# Patient Record
Sex: MALE | Race: WHITE | NOT HISPANIC OR LATINO | URBAN - METROPOLITAN AREA
[De-identification: names, ages, dates, MRNs, and addresses within clinical notes are randomized per-mention and may not be internally consistent; named-entity substitution may affect disease eponyms.]

---

## 2019-06-04 ENCOUNTER — APPOINTMENT (RX ONLY)
Dept: URBAN - METROPOLITAN AREA CLINIC 150 | Facility: CLINIC | Age: 74
Setting detail: DERMATOLOGY
End: 2019-06-04

## 2019-06-04 DIAGNOSIS — Z85.828 PERSONAL HISTORY OF OTHER MALIGNANT NEOPLASM OF SKIN: ICD-10-CM

## 2019-06-04 DIAGNOSIS — D22 MELANOCYTIC NEVI: ICD-10-CM

## 2019-06-04 DIAGNOSIS — D18.0 HEMANGIOMA: ICD-10-CM

## 2019-06-04 DIAGNOSIS — L82.1 OTHER SEBORRHEIC KERATOSIS: ICD-10-CM

## 2019-06-04 DIAGNOSIS — L81.4 OTHER MELANIN HYPERPIGMENTATION: ICD-10-CM

## 2019-06-04 PROBLEM — D18.01 HEMANGIOMA OF SKIN AND SUBCUTANEOUS TISSUE: Status: ACTIVE | Noted: 2019-06-04

## 2019-06-04 PROBLEM — I10 ESSENTIAL (PRIMARY) HYPERTENSION: Status: ACTIVE | Noted: 2019-06-04

## 2019-06-04 PROBLEM — D22.5 MELANOCYTIC NEVI OF TRUNK: Status: ACTIVE | Noted: 2019-06-04

## 2019-06-04 PROCEDURE — ? OTHER

## 2019-06-04 PROCEDURE — ? INVENTORY

## 2019-06-04 PROCEDURE — 99202 OFFICE O/P NEW SF 15 MIN: CPT

## 2019-06-04 PROCEDURE — ? OBSERVATION AND MEASURE

## 2019-06-04 PROCEDURE — ? COUNSELING

## 2019-06-04 ASSESSMENT — LOCATION SIMPLE DESCRIPTION DERM
LOCATION SIMPLE: RIGHT UPPER BACK
LOCATION SIMPLE: ABDOMEN
LOCATION SIMPLE: CHEST
LOCATION SIMPLE: LEFT UPPER BACK
LOCATION SIMPLE: LEFT FOREARM
LOCATION SIMPLE: RIGHT PRETIBIAL REGION
LOCATION SIMPLE: LEFT LOWER BACK
LOCATION SIMPLE: UPPER BACK

## 2019-06-04 ASSESSMENT — LOCATION DETAILED DESCRIPTION DERM
LOCATION DETAILED: SUPERIOR THORACIC SPINE
LOCATION DETAILED: RIGHT INFERIOR UPPER BACK
LOCATION DETAILED: PERIUMBILICAL SKIN
LOCATION DETAILED: RIGHT MEDIAL PROXIMAL PRETIBIAL REGION
LOCATION DETAILED: LEFT SUPERIOR UPPER BACK
LOCATION DETAILED: LEFT INFERIOR UPPER BACK
LOCATION DETAILED: LEFT PROXIMAL DORSAL FOREARM
LOCATION DETAILED: LEFT MEDIAL SUPERIOR CHEST
LOCATION DETAILED: LEFT SUPERIOR MEDIAL MIDBACK
LOCATION DETAILED: LEFT SUPERIOR MEDIAL UPPER BACK

## 2019-06-04 ASSESSMENT — LOCATION ZONE DERM
LOCATION ZONE: LEG
LOCATION ZONE: TRUNK
LOCATION ZONE: ARM

## 2019-06-04 NOTE — PROCEDURE: OTHER
Detail Level: Zone
Other (Free Text): Patient states spot has been present for 15 years and has been regularly monitored by prior dermatology offices. Offered biopsy of this area to rule out potential for malignancy. Patient declines at this time stating the area has not changed.
Note Text (......Xxx Chief Complaint.): This diagnosis correlates with the

## 2020-02-11 ENCOUNTER — APPOINTMENT (RX ONLY)
Dept: URBAN - METROPOLITAN AREA CLINIC 150 | Facility: CLINIC | Age: 75
Setting detail: DERMATOLOGY
End: 2020-02-11

## 2020-02-11 DIAGNOSIS — L82.0 INFLAMED SEBORRHEIC KERATOSIS: ICD-10-CM

## 2020-02-11 DIAGNOSIS — Z71.89 OTHER SPECIFIED COUNSELING: ICD-10-CM

## 2020-02-11 DIAGNOSIS — L57.0 ACTINIC KERATOSIS: ICD-10-CM

## 2020-02-11 DIAGNOSIS — L82.1 OTHER SEBORRHEIC KERATOSIS: ICD-10-CM

## 2020-02-11 DIAGNOSIS — L81.4 OTHER MELANIN HYPERPIGMENTATION: ICD-10-CM

## 2020-02-11 DIAGNOSIS — Z85.828 PERSONAL HISTORY OF OTHER MALIGNANT NEOPLASM OF SKIN: ICD-10-CM

## 2020-02-11 PROBLEM — D48.5 NEOPLASM OF UNCERTAIN BEHAVIOR OF SKIN: Status: ACTIVE | Noted: 2020-02-11

## 2020-02-11 PROCEDURE — 17003 DESTRUCT PREMALG LES 2-14: CPT | Mod: 59

## 2020-02-11 PROCEDURE — 11102 TANGNTL BX SKIN SINGLE LES: CPT | Mod: 59

## 2020-02-11 PROCEDURE — 99213 OFFICE O/P EST LOW 20 MIN: CPT | Mod: 25

## 2020-02-11 PROCEDURE — ? FULL BODY SKIN EXAM

## 2020-02-11 PROCEDURE — ? OTHER

## 2020-02-11 PROCEDURE — 17000 DESTRUCT PREMALG LESION: CPT | Mod: 59

## 2020-02-11 PROCEDURE — ? BIOPSY BY SHAVE METHOD

## 2020-02-11 PROCEDURE — 17110 DESTRUCTION B9 LES UP TO 14: CPT

## 2020-02-11 PROCEDURE — 69100 BIOPSY OF EXTERNAL EAR: CPT | Mod: 59

## 2020-02-11 PROCEDURE — ? LIQUID NITROGEN

## 2020-02-11 PROCEDURE — ? COUNSELING

## 2020-02-11 ASSESSMENT — LOCATION SIMPLE DESCRIPTION DERM
LOCATION SIMPLE: LEFT UPPER BACK
LOCATION SIMPLE: CHEST
LOCATION SIMPLE: LEFT LOWER BACK
LOCATION SIMPLE: RIGHT PRETIBIAL REGION
LOCATION SIMPLE: LEFT PRETIBIAL REGION
LOCATION SIMPLE: LEFT UPPER ARM
LOCATION SIMPLE: LEFT FOREARM
LOCATION SIMPLE: LEFT CHEEK
LOCATION SIMPLE: RIGHT CHEEK
LOCATION SIMPLE: LEFT THIGH
LOCATION SIMPLE: RIGHT UPPER BACK
LOCATION SIMPLE: RIGHT FOREARM
LOCATION SIMPLE: RIGHT SHOULDER
LOCATION SIMPLE: RIGHT THIGH

## 2020-02-11 ASSESSMENT — LOCATION DETAILED DESCRIPTION DERM
LOCATION DETAILED: LEFT PROXIMAL DORSAL FOREARM
LOCATION DETAILED: RIGHT INFERIOR CENTRAL MALAR CHEEK
LOCATION DETAILED: LEFT MID-UPPER BACK
LOCATION DETAILED: RIGHT PROXIMAL PRETIBIAL REGION
LOCATION DETAILED: LEFT ANTERIOR DISTAL THIGH
LOCATION DETAILED: LEFT CENTRAL MALAR CHEEK
LOCATION DETAILED: LEFT SUPERIOR UPPER BACK
LOCATION DETAILED: LEFT INFERIOR LATERAL UPPER BACK
LOCATION DETAILED: LEFT DISTAL DORSAL FOREARM
LOCATION DETAILED: LEFT MEDIAL SUPERIOR CHEST
LOCATION DETAILED: RIGHT PROXIMAL DORSAL FOREARM
LOCATION DETAILED: LEFT ANTERIOR PROXIMAL UPPER ARM
LOCATION DETAILED: RIGHT LATERAL MALAR CHEEK
LOCATION DETAILED: RIGHT ANTERIOR SHOULDER
LOCATION DETAILED: LEFT DISTAL PRETIBIAL REGION
LOCATION DETAILED: LEFT SUPERIOR MEDIAL MIDBACK
LOCATION DETAILED: RIGHT ANTERIOR DISTAL THIGH
LOCATION DETAILED: RIGHT SUPERIOR UPPER BACK

## 2020-02-11 ASSESSMENT — LOCATION ZONE DERM
LOCATION ZONE: ARM
LOCATION ZONE: LEG
LOCATION ZONE: FACE
LOCATION ZONE: TRUNK

## 2020-02-11 NOTE — PROCEDURE: LIQUID NITROGEN
Add 52 Modifier (Optional): no
Medical Necessity Information: It is in your best interest to select a reason for this procedure from the list below. All of these items fulfill various CMS LCD requirements except the new and changing color options.
Medical Necessity Clause: This procedure was medically necessary because the lesions that were treated were:
Post-Care Instructions: I reviewed with the patient in detail post-care instructions. Patient is to wear sunprotection, and avoid picking at any of the treated lesions. Pt may apply Vaseline to crusted or scabbing areas.
Consent: The patient's consent was obtained including but not limited to risks of crusting, scabbing, blistering, scarring, darker or lighter pigmentary change, recurrence, incomplete removal and infection.
Detail Level: Detailed
Duration Of Freeze Thaw-Cycle (Seconds): 0

## 2020-02-11 NOTE — PROCEDURE: BIOPSY BY SHAVE METHOD
Detail Level: Detailed
Depth Of Biopsy: dermis
Was A Bandage Applied: Yes
Size Of Lesion In Cm: 0
Biopsy Type: H and E
Biopsy Method: Dermablade
Anesthesia Type: 1% lidocaine with epinephrine
Anesthesia Volume In Cc (Will Not Render If 0): 0.5
Hemostasis: Drysol
Wound Care: Petrolatum
Dressing: bandage
Destruction After The Procedure: No
Type Of Destruction Used: Curettage
Curettage Text: The wound bed was treated with curettage after the biopsy was performed.
Cryotherapy Text: The wound bed was treated with cryotherapy after the biopsy was performed.
Electrodesiccation Text: The wound bed was treated with electrodesiccation after the biopsy was performed.
Electrodesiccation And Curettage Text: The wound bed was treated with electrodesiccation and curettage after the biopsy was performed.
Silver Nitrate Text: The wound bed was treated with silver nitrate after the biopsy was performed.
Lab: [id number]
Consent: Written consent was obtained and risks were reviewed including but not limited to scarring, infection, bleeding, scabbing, incomplete removal, nerve damage and allergy to anesthesia.
Post-Care Instructions: I reviewed with the patient in detail post-care instructions. Patient is to keep the biopsy site dry overnight, and then apply bacitracin twice daily until healed. Patient may apply hydrogen peroxide soaks to remove any crusting.
Notification Instructions: Patient will be notified of biopsy results. However, patient instructed to call the office if not contacted within 2 weeks.
Billing Type: Third-Party Bill

## 2020-02-11 NOTE — PROCEDURE: OTHER
Other (Free Text): Patient reports that this has been present for about 6 years and [place] It has changes appearance in the past month. Biopsy obtained.
Detail Level: Zone
Note Text (......Xxx Chief Complaint.): This diagnosis correlates with the

## 2020-08-11 ENCOUNTER — APPOINTMENT (RX ONLY)
Dept: URBAN - METROPOLITAN AREA CLINIC 150 | Facility: CLINIC | Age: 75
Setting detail: DERMATOLOGY
End: 2020-08-11

## 2020-08-11 DIAGNOSIS — T63.42 TOXIC EFFECT OF VENOM OF ANTS: ICD-10-CM

## 2020-08-11 DIAGNOSIS — Z85.828 PERSONAL HISTORY OF OTHER MALIGNANT NEOPLASM OF SKIN: ICD-10-CM

## 2020-08-11 DIAGNOSIS — L82.1 OTHER SEBORRHEIC KERATOSIS: ICD-10-CM

## 2020-08-11 DIAGNOSIS — Z71.89 OTHER SPECIFIED COUNSELING: ICD-10-CM

## 2020-08-11 DIAGNOSIS — D18.0 HEMANGIOMA: ICD-10-CM

## 2020-08-11 DIAGNOSIS — Z85.820 PERSONAL HISTORY OF MALIGNANT MELANOMA OF SKIN: ICD-10-CM

## 2020-08-11 DIAGNOSIS — L81.4 OTHER MELANIN HYPERPIGMENTATION: ICD-10-CM

## 2020-08-11 PROBLEM — T63.421A TOXIC EFFECT OF VENOM OF ANTS, ACCIDENTAL (UNINTENTIONAL), INITIAL ENCOUNTER: Status: ACTIVE | Noted: 2020-08-11

## 2020-08-11 PROBLEM — D18.01 HEMANGIOMA OF SKIN AND SUBCUTANEOUS TISSUE: Status: ACTIVE | Noted: 2020-08-11

## 2020-08-11 PROCEDURE — ? FULL BODY SKIN EXAM

## 2020-08-11 PROCEDURE — 99214 OFFICE O/P EST MOD 30 MIN: CPT

## 2020-08-11 PROCEDURE — ? ADDITIONAL NOTES

## 2020-08-11 PROCEDURE — ? COUNSELING

## 2020-08-11 ASSESSMENT — LOCATION SIMPLE DESCRIPTION DERM
LOCATION SIMPLE: LEFT THIGH
LOCATION SIMPLE: RIGHT PRETIBIAL REGION
LOCATION SIMPLE: RIGHT SHOULDER
LOCATION SIMPLE: RIGHT FOREARM
LOCATION SIMPLE: LEFT PRETIBIAL REGION
LOCATION SIMPLE: CHEST
LOCATION SIMPLE: LEFT FOREARM
LOCATION SIMPLE: LEFT UPPER ARM
LOCATION SIMPLE: RIGHT EAR
LOCATION SIMPLE: LEFT UPPER BACK
LOCATION SIMPLE: ABDOMEN
LOCATION SIMPLE: LEFT LOWER BACK
LOCATION SIMPLE: RIGHT UPPER BACK
LOCATION SIMPLE: RIGHT CALF
LOCATION SIMPLE: LEFT POSTERIOR THIGH
LOCATION SIMPLE: RIGHT UPPER ARM

## 2020-08-11 ASSESSMENT — LOCATION DETAILED DESCRIPTION DERM
LOCATION DETAILED: LEFT VENTRAL PROXIMAL FOREARM
LOCATION DETAILED: LEFT SUPERIOR LATERAL UPPER BACK
LOCATION DETAILED: PERIUMBILICAL SKIN
LOCATION DETAILED: LEFT LATERAL SUPERIOR CHEST
LOCATION DETAILED: RIGHT ANTERIOR DISTAL UPPER ARM
LOCATION DETAILED: RIGHT DISTAL CALF
LOCATION DETAILED: RIGHT SUPERIOR UPPER BACK
LOCATION DETAILED: RIGHT DISTAL DORSAL FOREARM
LOCATION DETAILED: RIGHT INFERIOR UPPER BACK
LOCATION DETAILED: EPIGASTRIC SKIN
LOCATION DETAILED: RIGHT ANTERIOR SHOULDER
LOCATION DETAILED: LEFT SUPERIOR MEDIAL MIDBACK
LOCATION DETAILED: LEFT INFERIOR MEDIAL UPPER BACK
LOCATION DETAILED: RIGHT SUPERIOR HELIX
LOCATION DETAILED: RIGHT PROXIMAL PRETIBIAL REGION
LOCATION DETAILED: LEFT DISTAL POSTERIOR THIGH
LOCATION DETAILED: LEFT DISTAL PRETIBIAL REGION
LOCATION DETAILED: RIGHT MID-UPPER BACK
LOCATION DETAILED: LEFT PROXIMAL DORSAL FOREARM
LOCATION DETAILED: LEFT SUPERIOR MEDIAL UPPER BACK
LOCATION DETAILED: LEFT ANTERIOR DISTAL THIGH
LOCATION DETAILED: LEFT DISTAL POSTERIOR UPPER ARM

## 2020-08-11 ASSESSMENT — LOCATION ZONE DERM
LOCATION ZONE: LEG
LOCATION ZONE: TRUNK
LOCATION ZONE: EAR
LOCATION ZONE: ARM

## 2020-11-10 ENCOUNTER — APPOINTMENT (RX ONLY)
Dept: URBAN - METROPOLITAN AREA CLINIC 150 | Facility: CLINIC | Age: 75
Setting detail: DERMATOLOGY
End: 2020-11-10

## 2020-11-10 DIAGNOSIS — L30.4 ERYTHEMA INTERTRIGO: ICD-10-CM

## 2020-11-10 DIAGNOSIS — Z85.820 PERSONAL HISTORY OF MALIGNANT MELANOMA OF SKIN: ICD-10-CM

## 2020-11-10 DIAGNOSIS — L90.5 SCAR CONDITIONS AND FIBROSIS OF SKIN: ICD-10-CM

## 2020-11-10 DIAGNOSIS — L72.0 EPIDERMAL CYST: ICD-10-CM

## 2020-11-10 DIAGNOSIS — L81.4 OTHER MELANIN HYPERPIGMENTATION: ICD-10-CM

## 2020-11-10 DIAGNOSIS — Z85.828 PERSONAL HISTORY OF OTHER MALIGNANT NEOPLASM OF SKIN: ICD-10-CM

## 2020-11-10 DIAGNOSIS — D18.0 HEMANGIOMA: ICD-10-CM

## 2020-11-10 DIAGNOSIS — L82.1 OTHER SEBORRHEIC KERATOSIS: ICD-10-CM

## 2020-11-10 DIAGNOSIS — D22 MELANOCYTIC NEVI: ICD-10-CM

## 2020-11-10 DIAGNOSIS — T63.42 TOXIC EFFECT OF VENOM OF ANTS: ICD-10-CM

## 2020-11-10 DIAGNOSIS — Z71.89 OTHER SPECIFIED COUNSELING: ICD-10-CM

## 2020-11-10 PROBLEM — D18.01 HEMANGIOMA OF SKIN AND SUBCUTANEOUS TISSUE: Status: ACTIVE | Noted: 2020-11-10

## 2020-11-10 PROBLEM — T63.421A TOXIC EFFECT OF VENOM OF ANTS, ACCIDENTAL (UNINTENTIONAL), INITIAL ENCOUNTER: Status: ACTIVE | Noted: 2020-11-10

## 2020-11-10 PROBLEM — D22.5 MELANOCYTIC NEVI OF TRUNK: Status: ACTIVE | Noted: 2020-11-10

## 2020-11-10 PROCEDURE — ? OTHER

## 2020-11-10 PROCEDURE — ? FULL BODY SKIN EXAM - DECLINED

## 2020-11-10 PROCEDURE — ? COUNSELING

## 2020-11-10 PROCEDURE — 99214 OFFICE O/P EST MOD 30 MIN: CPT

## 2020-11-10 ASSESSMENT — LOCATION DETAILED DESCRIPTION DERM
LOCATION DETAILED: RIGHT SUPERIOR MEDIAL UPPER BACK
LOCATION DETAILED: RIGHT SUPERIOR MEDIAL LOWER BACK
LOCATION DETAILED: LEFT BUTTOCK
LOCATION DETAILED: LEFT SUPERIOR MEDIAL UPPER BACK
LOCATION DETAILED: RIGHT DISTAL POSTERIOR THIGH
LOCATION DETAILED: LEFT PROXIMAL CALF
LOCATION DETAILED: RIGHT PROXIMAL DORSAL FOREARM
LOCATION DETAILED: LEFT DISTAL PRETIBIAL REGION
LOCATION DETAILED: RIGHT MEDIAL SUPERIOR CHEST
LOCATION DETAILED: LEFT DISTAL POSTERIOR THIGH
LOCATION DETAILED: EPIGASTRIC SKIN
LOCATION DETAILED: LEFT SUPERIOR MEDIAL MIDBACK
LOCATION DETAILED: RIGHT SUPERIOR MEDIAL MIDBACK
LOCATION DETAILED: RIGHT SUPERIOR LATERAL UPPER BACK
LOCATION DETAILED: RIGHT POSTERIOR SHOULDER
LOCATION DETAILED: LEFT DORSAL FOOT
LOCATION DETAILED: LEFT MID-UPPER BACK
LOCATION DETAILED: RIGHT DISTAL CALF
LOCATION DETAILED: RIGHT SUPERIOR HELIX
LOCATION DETAILED: RIGHT DISTAL DORSAL FOREARM
LOCATION DETAILED: SUPRAPUBIC SKIN
LOCATION DETAILED: RIGHT SUPERIOR UPPER BACK

## 2020-11-10 ASSESSMENT — LOCATION SIMPLE DESCRIPTION DERM
LOCATION SIMPLE: LEFT UPPER BACK
LOCATION SIMPLE: ABDOMEN
LOCATION SIMPLE: RIGHT POSTERIOR THIGH
LOCATION SIMPLE: LEFT FOOT
LOCATION SIMPLE: RIGHT LOWER BACK
LOCATION SIMPLE: RIGHT FOREARM
LOCATION SIMPLE: RIGHT CALF
LOCATION SIMPLE: RIGHT UPPER BACK
LOCATION SIMPLE: RIGHT EAR
LOCATION SIMPLE: LEFT CALF
LOCATION SIMPLE: RIGHT SHOULDER
LOCATION SIMPLE: CHEST
LOCATION SIMPLE: LEFT POSTERIOR THIGH
LOCATION SIMPLE: GROIN
LOCATION SIMPLE: LEFT LOWER BACK
LOCATION SIMPLE: LEFT PRETIBIAL REGION
LOCATION SIMPLE: LEFT BUTTOCK

## 2020-11-10 ASSESSMENT — LOCATION ZONE DERM
LOCATION ZONE: TRUNK
LOCATION ZONE: EAR
LOCATION ZONE: FEET
LOCATION ZONE: LEG
LOCATION ZONE: ARM

## 2020-11-10 NOTE — PROCEDURE: OTHER
Other (Free Text): Vitamin e oil massage may help with scarring.
Detail Level: Zone
Note Text (......Xxx Chief Complaint.): This diagnosis correlates with the

## 2021-04-13 ENCOUNTER — APPOINTMENT (RX ONLY)
Dept: URBAN - METROPOLITAN AREA CLINIC 150 | Facility: CLINIC | Age: 76
Setting detail: DERMATOLOGY
End: 2021-04-13

## 2021-04-13 DIAGNOSIS — L82.1 OTHER SEBORRHEIC KERATOSIS: ICD-10-CM

## 2021-04-13 DIAGNOSIS — Z71.89 OTHER SPECIFIED COUNSELING: ICD-10-CM

## 2021-04-13 DIAGNOSIS — Z85.828 PERSONAL HISTORY OF OTHER MALIGNANT NEOPLASM OF SKIN: ICD-10-CM

## 2021-04-13 DIAGNOSIS — D18.0 HEMANGIOMA: ICD-10-CM

## 2021-04-13 DIAGNOSIS — Z85.820 PERSONAL HISTORY OF MALIGNANT MELANOMA OF SKIN: ICD-10-CM

## 2021-04-13 DIAGNOSIS — D22 MELANOCYTIC NEVI: ICD-10-CM

## 2021-04-13 DIAGNOSIS — L85.3 XEROSIS CUTIS: ICD-10-CM

## 2021-04-13 DIAGNOSIS — L90.5 SCAR CONDITIONS AND FIBROSIS OF SKIN: ICD-10-CM

## 2021-04-13 DIAGNOSIS — L81.4 OTHER MELANIN HYPERPIGMENTATION: ICD-10-CM

## 2021-04-13 DIAGNOSIS — L28.1 PRURIGO NODULARIS: ICD-10-CM | Status: WELL CONTROLLED

## 2021-04-13 PROBLEM — D18.01 HEMANGIOMA OF SKIN AND SUBCUTANEOUS TISSUE: Status: ACTIVE | Noted: 2021-04-13

## 2021-04-13 PROBLEM — D22.5 MELANOCYTIC NEVI OF TRUNK: Status: ACTIVE | Noted: 2021-04-13

## 2021-04-13 PROCEDURE — ? ADDITIONAL NOTES

## 2021-04-13 PROCEDURE — ? COUNSELING

## 2021-04-13 PROCEDURE — 99213 OFFICE O/P EST LOW 20 MIN: CPT

## 2021-04-13 PROCEDURE — ? FULL BODY SKIN EXAM

## 2021-04-13 PROCEDURE — ? OTHER

## 2021-04-13 ASSESSMENT — LOCATION SIMPLE DESCRIPTION DERM
LOCATION SIMPLE: RIGHT EAR
LOCATION SIMPLE: LEFT UPPER BACK
LOCATION SIMPLE: RIGHT THIGH
LOCATION SIMPLE: LEFT BUTTOCK
LOCATION SIMPLE: LEFT LOWER BACK
LOCATION SIMPLE: RIGHT FOREARM
LOCATION SIMPLE: RIGHT LOWER BACK
LOCATION SIMPLE: LEFT ANTERIOR NECK
LOCATION SIMPLE: RIGHT PRETIBIAL REGION
LOCATION SIMPLE: CHEST
LOCATION SIMPLE: RIGHT UPPER BACK
LOCATION SIMPLE: RIGHT SHOULDER
LOCATION SIMPLE: LEFT PRETIBIAL REGION

## 2021-04-13 ASSESSMENT — LOCATION DETAILED DESCRIPTION DERM
LOCATION DETAILED: RIGHT SUPERIOR MEDIAL UPPER BACK
LOCATION DETAILED: RIGHT ANTERIOR PROXIMAL THIGH
LOCATION DETAILED: RIGHT PROXIMAL DORSAL FOREARM
LOCATION DETAILED: LEFT INFERIOR UPPER BACK
LOCATION DETAILED: RIGHT SUPERIOR UPPER BACK
LOCATION DETAILED: LEFT INFERIOR ANTERIOR NECK
LOCATION DETAILED: LEFT BUTTOCK
LOCATION DETAILED: RIGHT SUPERIOR HELIX
LOCATION DETAILED: STERNUM
LOCATION DETAILED: RIGHT PROXIMAL PRETIBIAL REGION
LOCATION DETAILED: LEFT DISTAL PRETIBIAL REGION
LOCATION DETAILED: RIGHT DISTAL DORSAL FOREARM
LOCATION DETAILED: RIGHT SUPERIOR MEDIAL MIDBACK
LOCATION DETAILED: LEFT PROXIMAL PRETIBIAL REGION
LOCATION DETAILED: LEFT SUPERIOR MEDIAL MIDBACK
LOCATION DETAILED: RIGHT VENTRAL DISTAL FOREARM
LOCATION DETAILED: RIGHT POSTERIOR SHOULDER
LOCATION DETAILED: RIGHT DISTAL PRETIBIAL REGION

## 2021-04-13 ASSESSMENT — LOCATION ZONE DERM
LOCATION ZONE: LEG
LOCATION ZONE: TRUNK
LOCATION ZONE: NECK
LOCATION ZONE: ARM
LOCATION ZONE: EAR

## 2021-10-12 ENCOUNTER — APPOINTMENT (RX ONLY)
Dept: URBAN - METROPOLITAN AREA CLINIC 150 | Facility: CLINIC | Age: 76
Setting detail: DERMATOLOGY
End: 2021-10-12

## 2021-10-12 DIAGNOSIS — L28.1 PRURIGO NODULARIS: ICD-10-CM

## 2021-10-12 DIAGNOSIS — Z71.89 OTHER SPECIFIED COUNSELING: ICD-10-CM

## 2021-10-12 DIAGNOSIS — D18.0 HEMANGIOMA: ICD-10-CM

## 2021-10-12 DIAGNOSIS — L82.1 OTHER SEBORRHEIC KERATOSIS: ICD-10-CM

## 2021-10-12 DIAGNOSIS — D69.2 OTHER NONTHROMBOCYTOPENIC PURPURA: ICD-10-CM

## 2021-10-12 DIAGNOSIS — Z85.828 PERSONAL HISTORY OF OTHER MALIGNANT NEOPLASM OF SKIN: ICD-10-CM

## 2021-10-12 DIAGNOSIS — L73.8 OTHER SPECIFIED FOLLICULAR DISORDERS: ICD-10-CM

## 2021-10-12 DIAGNOSIS — Z85.820 PERSONAL HISTORY OF MALIGNANT MELANOMA OF SKIN: ICD-10-CM

## 2021-10-12 PROBLEM — D18.01 HEMANGIOMA OF SKIN AND SUBCUTANEOUS TISSUE: Status: ACTIVE | Noted: 2021-10-12

## 2021-10-12 PROCEDURE — ? FULL BODY SKIN EXAM

## 2021-10-12 PROCEDURE — 99213 OFFICE O/P EST LOW 20 MIN: CPT

## 2021-10-12 PROCEDURE — ? COUNSELING

## 2021-10-12 PROCEDURE — ? ADDITIONAL NOTES

## 2021-10-12 ASSESSMENT — LOCATION ZONE DERM
LOCATION ZONE: ARM
LOCATION ZONE: TRUNK
LOCATION ZONE: LEG
LOCATION ZONE: EAR
LOCATION ZONE: FACE

## 2021-10-12 ASSESSMENT — LOCATION SIMPLE DESCRIPTION DERM
LOCATION SIMPLE: RIGHT EAR
LOCATION SIMPLE: RIGHT FOREARM
LOCATION SIMPLE: ABDOMEN
LOCATION SIMPLE: RIGHT ANKLE
LOCATION SIMPLE: UPPER BACK
LOCATION SIMPLE: LEFT FOREARM
LOCATION SIMPLE: RIGHT UPPER BACK
LOCATION SIMPLE: LEFT ANKLE
LOCATION SIMPLE: RIGHT THIGH
LOCATION SIMPLE: RIGHT CHEEK

## 2021-10-12 ASSESSMENT — LOCATION DETAILED DESCRIPTION DERM
LOCATION DETAILED: RIGHT DISTAL DORSAL FOREARM
LOCATION DETAILED: RIGHT SUPERIOR HELIX
LOCATION DETAILED: LEFT RIB CAGE
LOCATION DETAILED: RIGHT CENTRAL MALAR CHEEK
LOCATION DETAILED: LEFT PROXIMAL DORSAL FOREARM
LOCATION DETAILED: SUPERIOR THORACIC SPINE
LOCATION DETAILED: RIGHT ANTERIOR DISTAL THIGH
LOCATION DETAILED: LEFT DISTAL DORSAL FOREARM
LOCATION DETAILED: RIGHT ANKLE
LOCATION DETAILED: RIGHT INFERIOR MEDIAL UPPER BACK
LOCATION DETAILED: EPIGASTRIC SKIN
LOCATION DETAILED: LEFT ANKLE

## 2021-10-12 NOTE — HPI: NON-MELANOMA SKIN CANCER F/U (HISTORY OF NMSC)
How Many Skin Cancers Have You Had?: more than one
What Is The Reason For Today's Visit?: Surveillance against skin cancer recurrences
When Was Your Last Cancer Diagnosed?: 2-2020

## 2021-10-12 NOTE — HPI: MELANOMA F/U (HISTORY OF MALIGNANT MELANOMA)
What Is The Reason For Today's Visit?: Surveillance against skin cancer recurrences
Year Excised?: 2-2020

## 2022-04-19 ENCOUNTER — APPOINTMENT (RX ONLY)
Dept: URBAN - METROPOLITAN AREA CLINIC 150 | Facility: CLINIC | Age: 77
Setting detail: DERMATOLOGY
End: 2022-04-19

## 2022-04-19 DIAGNOSIS — Z85.828 PERSONAL HISTORY OF OTHER MALIGNANT NEOPLASM OF SKIN: ICD-10-CM | Status: RESOLVED

## 2022-04-19 DIAGNOSIS — L82.1 OTHER SEBORRHEIC KERATOSIS: ICD-10-CM

## 2022-04-19 DIAGNOSIS — L72.8 OTHER FOLLICULAR CYSTS OF THE SKIN AND SUBCUTANEOUS TISSUE: ICD-10-CM

## 2022-04-19 DIAGNOSIS — L73.8 OTHER SPECIFIED FOLLICULAR DISORDERS: ICD-10-CM | Status: INADEQUATELY CONTROLLED

## 2022-04-19 DIAGNOSIS — D18.0 HEMANGIOMA: ICD-10-CM

## 2022-04-19 DIAGNOSIS — Z85.820 PERSONAL HISTORY OF MALIGNANT MELANOMA OF SKIN: ICD-10-CM | Status: RESOLVED

## 2022-04-19 DIAGNOSIS — Z71.89 OTHER SPECIFIED COUNSELING: ICD-10-CM

## 2022-04-19 DIAGNOSIS — L28.1 PRURIGO NODULARIS: ICD-10-CM

## 2022-04-19 DIAGNOSIS — L81.4 OTHER MELANIN HYPERPIGMENTATION: ICD-10-CM

## 2022-04-19 PROBLEM — D18.01 HEMANGIOMA OF SKIN AND SUBCUTANEOUS TISSUE: Status: ACTIVE | Noted: 2022-04-19

## 2022-04-19 PROCEDURE — ? COUNSELING

## 2022-04-19 PROCEDURE — ? ADDITIONAL NOTES

## 2022-04-19 PROCEDURE — 99213 OFFICE O/P EST LOW 20 MIN: CPT

## 2022-04-19 PROCEDURE — ? OTHER

## 2022-04-19 PROCEDURE — ? FULL BODY SKIN EXAM

## 2022-04-19 ASSESSMENT — LOCATION DETAILED DESCRIPTION DERM
LOCATION DETAILED: RIGHT LATERAL FOREHEAD
LOCATION DETAILED: LEFT SUPERIOR LATERAL UPPER BACK
LOCATION DETAILED: RIGHT ANTERIOR SHOULDER
LOCATION DETAILED: RIGHT LATERAL MALAR CHEEK
LOCATION DETAILED: LEFT POSTERIOR SHOULDER
LOCATION DETAILED: RIGHT MEDIAL FOREHEAD
LOCATION DETAILED: RIGHT ANTERIOR DISTAL UPPER ARM
LOCATION DETAILED: RIGHT LATERAL PROXIMAL UPPER ARM
LOCATION DETAILED: RIGHT PROXIMAL PRETIBIAL REGION
LOCATION DETAILED: LEFT POSTERIOR ANKLE
LOCATION DETAILED: PERIUMBILICAL SKIN
LOCATION DETAILED: RIGHT LATERAL SHOULDER
LOCATION DETAILED: RIGHT SUPERIOR MEDIAL MIDBACK
LOCATION DETAILED: RIGHT ANTERIOR DISTAL THIGH
LOCATION DETAILED: RIGHT MEDIAL UPPER BACK
LOCATION DETAILED: RIGHT LATERAL SHOULDER
LOCATION DETAILED: RIGHT SUPERIOR LATERAL MALAR CHEEK
LOCATION DETAILED: RIGHT LATERAL PROXIMAL UPPER ARM
LOCATION DETAILED: RIGHT DISTAL POSTERIOR THIGH
LOCATION DETAILED: RIGHT POSTERIOR ANKLE
LOCATION DETAILED: LEFT MEDIAL UPPER BACK
LOCATION DETAILED: LEFT DISTAL POSTERIOR UPPER ARM
LOCATION DETAILED: LEFT FOREHEAD
LOCATION DETAILED: RIGHT PROXIMAL CALF
LOCATION DETAILED: RIGHT SUPERIOR HELIX
LOCATION DETAILED: RIGHT INFERIOR MEDIAL FOREHEAD
LOCATION DETAILED: LEFT CENTRAL MALAR CHEEK
LOCATION DETAILED: RIGHT DISTAL PRETIBIAL REGION
LOCATION DETAILED: LEFT ANTERIOR PROXIMAL THIGH
LOCATION DETAILED: LEFT SUPERIOR CENTRAL MALAR CHEEK
LOCATION DETAILED: RIGHT ANTERIOR PROXIMAL THIGH
LOCATION DETAILED: LEFT DISTAL PRETIBIAL REGION
LOCATION DETAILED: RIGHT PROXIMAL DORSAL FOREARM
LOCATION DETAILED: LEFT DISTAL CALF
LOCATION DETAILED: LEFT PROXIMAL POSTERIOR THIGH
LOCATION DETAILED: STERNUM
LOCATION DETAILED: RIGHT POSTERIOR SHOULDER
LOCATION DETAILED: LEFT PROXIMAL DORSAL FOREARM
LOCATION DETAILED: EPIGASTRIC SKIN

## 2022-04-19 ASSESSMENT — LOCATION SIMPLE DESCRIPTION DERM
LOCATION SIMPLE: RIGHT THIGH
LOCATION SIMPLE: RIGHT CALF
LOCATION SIMPLE: LEFT CHEEK
LOCATION SIMPLE: RIGHT SHOULDER
LOCATION SIMPLE: RIGHT EAR
LOCATION SIMPLE: LEFT UPPER ARM
LOCATION SIMPLE: LEFT ANKLE
LOCATION SIMPLE: RIGHT FOREHEAD
LOCATION SIMPLE: LEFT FOREARM
LOCATION SIMPLE: RIGHT CHEEK
LOCATION SIMPLE: CHEST
LOCATION SIMPLE: RIGHT SHOULDER
LOCATION SIMPLE: LEFT FOREHEAD
LOCATION SIMPLE: RIGHT UPPER ARM
LOCATION SIMPLE: LEFT POSTERIOR THIGH
LOCATION SIMPLE: RIGHT ANKLE
LOCATION SIMPLE: RIGHT UPPER ARM
LOCATION SIMPLE: RIGHT POSTERIOR THIGH
LOCATION SIMPLE: LEFT UPPER BACK
LOCATION SIMPLE: LEFT PRETIBIAL REGION
LOCATION SIMPLE: LEFT SHOULDER
LOCATION SIMPLE: RIGHT PRETIBIAL REGION
LOCATION SIMPLE: LEFT CALF
LOCATION SIMPLE: RIGHT UPPER BACK
LOCATION SIMPLE: ABDOMEN
LOCATION SIMPLE: RIGHT LOWER BACK
LOCATION SIMPLE: LEFT THIGH
LOCATION SIMPLE: RIGHT FOREARM

## 2022-04-19 ASSESSMENT — LOCATION ZONE DERM
LOCATION ZONE: LEG
LOCATION ZONE: EAR
LOCATION ZONE: ARM
LOCATION ZONE: TRUNK
LOCATION ZONE: FACE
LOCATION ZONE: ARM

## 2022-10-25 ENCOUNTER — APPOINTMENT (RX ONLY)
Dept: URBAN - METROPOLITAN AREA CLINIC 151 | Facility: CLINIC | Age: 77
Setting detail: DERMATOLOGY
End: 2022-10-25

## 2022-10-25 DIAGNOSIS — L81.4 OTHER MELANIN HYPERPIGMENTATION: ICD-10-CM

## 2022-10-25 DIAGNOSIS — L73.8 OTHER SPECIFIED FOLLICULAR DISORDERS: ICD-10-CM

## 2022-10-25 DIAGNOSIS — L28.1 PRURIGO NODULARIS: ICD-10-CM

## 2022-10-25 DIAGNOSIS — Z85.820 PERSONAL HISTORY OF MALIGNANT MELANOMA OF SKIN: ICD-10-CM

## 2022-10-25 DIAGNOSIS — D18.0 HEMANGIOMA: ICD-10-CM

## 2022-10-25 DIAGNOSIS — Z85.828 PERSONAL HISTORY OF OTHER MALIGNANT NEOPLASM OF SKIN: ICD-10-CM

## 2022-10-25 DIAGNOSIS — L82.1 OTHER SEBORRHEIC KERATOSIS: ICD-10-CM

## 2022-10-25 PROBLEM — D18.01 HEMANGIOMA OF SKIN AND SUBCUTANEOUS TISSUE: Status: ACTIVE | Noted: 2022-10-25

## 2022-10-25 PROCEDURE — ? OTHER

## 2022-10-25 PROCEDURE — ? FULL BODY SKIN EXAM

## 2022-10-25 PROCEDURE — 99213 OFFICE O/P EST LOW 20 MIN: CPT

## 2022-10-25 PROCEDURE — ? ADDITIONAL NOTES

## 2022-10-25 PROCEDURE — ? COUNSELING

## 2022-10-25 PROCEDURE — ? TREATMENT REGIMEN

## 2022-10-25 ASSESSMENT — LOCATION SIMPLE DESCRIPTION DERM
LOCATION SIMPLE: RIGHT SHOULDER
LOCATION SIMPLE: RIGHT CHEEK
LOCATION SIMPLE: LEFT UPPER ARM
LOCATION SIMPLE: LEFT ANKLE
LOCATION SIMPLE: LEFT FOREHEAD
LOCATION SIMPLE: CHEST
LOCATION SIMPLE: RIGHT UPPER BACK
LOCATION SIMPLE: RIGHT EAR
LOCATION SIMPLE: RIGHT FOREARM
LOCATION SIMPLE: RIGHT ANKLE
LOCATION SIMPLE: LEFT PRETIBIAL REGION
LOCATION SIMPLE: RIGHT LOWER BACK
LOCATION SIMPLE: RIGHT THIGH
LOCATION SIMPLE: LEFT UPPER BACK
LOCATION SIMPLE: LEFT CHEEK
LOCATION SIMPLE: RIGHT PRETIBIAL REGION
LOCATION SIMPLE: RIGHT FOREHEAD
LOCATION SIMPLE: ABDOMEN

## 2022-10-25 ASSESSMENT — LOCATION DETAILED DESCRIPTION DERM
LOCATION DETAILED: LEFT POSTERIOR ANKLE
LOCATION DETAILED: RIGHT SUPERIOR HELIX
LOCATION DETAILED: LEFT SUPERIOR LATERAL UPPER BACK
LOCATION DETAILED: RIGHT ANTERIOR DISTAL THIGH
LOCATION DETAILED: RIGHT POSTERIOR ANKLE
LOCATION DETAILED: STERNUM
LOCATION DETAILED: RIGHT SUPERIOR MEDIAL MIDBACK
LOCATION DETAILED: PERIUMBILICAL SKIN
LOCATION DETAILED: RIGHT PROXIMAL DORSAL FOREARM
LOCATION DETAILED: RIGHT PROXIMAL RADIAL DORSAL FOREARM
LOCATION DETAILED: RIGHT MEDIAL MALAR CHEEK
LOCATION DETAILED: RIGHT POSTERIOR SHOULDER
LOCATION DETAILED: RIGHT INFERIOR LATERAL FOREHEAD
LOCATION DETAILED: RIGHT MEDIAL UPPER BACK
LOCATION DETAILED: RIGHT ANTERIOR PROXIMAL THIGH
LOCATION DETAILED: LEFT DISTAL PRETIBIAL REGION
LOCATION DETAILED: LEFT MEDIAL UPPER BACK
LOCATION DETAILED: RIGHT DISTAL PRETIBIAL REGION
LOCATION DETAILED: LEFT DISTAL POSTERIOR UPPER ARM
LOCATION DETAILED: RIGHT SUPERIOR MEDIAL UPPER BACK
LOCATION DETAILED: LEFT INFERIOR LATERAL FOREHEAD
LOCATION DETAILED: LEFT MEDIAL MALAR CHEEK

## 2022-10-25 ASSESSMENT — LOCATION ZONE DERM
LOCATION ZONE: ARM
LOCATION ZONE: EAR
LOCATION ZONE: FACE
LOCATION ZONE: TRUNK
LOCATION ZONE: LEG

## 2022-10-25 NOTE — PROCEDURE: OTHER
Other (Free Text): 2018
Detail Level: Zone
Note Text (......Xxx Chief Complaint.): This diagnosis correlates with the
Render Risk Assessment In Note?: yes

## 2022-10-25 NOTE — PROCEDURE: COUNSELING
Detail Level: Zone
Detail Level: Detailed
Quality 137: Melanoma: Continuity Of Care - Recall System: Patient information entered into a recall system that includes: target date for the next exam specified AND a process to follow up with patients regarding missed or unscheduled appointments
When Should The Patient Follow-Up For Their Next Full-Body Skin Exam?: 3 Months
When Should The Patient Follow-Up For Their Next Full-Body Skin Exam?: 6 Months
Detail Level: Generalized

## 2023-04-25 ENCOUNTER — APPOINTMENT (RX ONLY)
Dept: URBAN - METROPOLITAN AREA CLINIC 151 | Facility: CLINIC | Age: 78
Setting detail: DERMATOLOGY
End: 2023-04-25

## 2023-04-25 DIAGNOSIS — Z85.820 PERSONAL HISTORY OF MALIGNANT MELANOMA OF SKIN: ICD-10-CM | Status: RESOLVED

## 2023-04-25 DIAGNOSIS — D18.0 HEMANGIOMA: ICD-10-CM

## 2023-04-25 DIAGNOSIS — L81.4 OTHER MELANIN HYPERPIGMENTATION: ICD-10-CM

## 2023-04-25 DIAGNOSIS — D22 MELANOCYTIC NEVI: ICD-10-CM

## 2023-04-25 DIAGNOSIS — L82.1 OTHER SEBORRHEIC KERATOSIS: ICD-10-CM

## 2023-04-25 DIAGNOSIS — Z85.828 PERSONAL HISTORY OF OTHER MALIGNANT NEOPLASM OF SKIN: ICD-10-CM | Status: RESOLVED

## 2023-04-25 DIAGNOSIS — L72.8 OTHER FOLLICULAR CYSTS OF THE SKIN AND SUBCUTANEOUS TISSUE: ICD-10-CM | Status: STABLE

## 2023-04-25 PROBLEM — D18.01 HEMANGIOMA OF SKIN AND SUBCUTANEOUS TISSUE: Status: ACTIVE | Noted: 2023-04-25

## 2023-04-25 PROBLEM — D22.5 MELANOCYTIC NEVI OF TRUNK: Status: ACTIVE | Noted: 2023-04-25

## 2023-04-25 PROCEDURE — ? COUNSELING

## 2023-04-25 PROCEDURE — ? FULL BODY SKIN EXAM

## 2023-04-25 PROCEDURE — ? TREATMENT REGIMEN

## 2023-04-25 PROCEDURE — 99213 OFFICE O/P EST LOW 20 MIN: CPT

## 2023-04-25 PROCEDURE — ? OTHER

## 2023-04-25 ASSESSMENT — LOCATION DETAILED DESCRIPTION DERM
LOCATION DETAILED: SUPERIOR THORACIC SPINE
LOCATION DETAILED: LEFT ANTERIOR DISTAL THIGH
LOCATION DETAILED: RIGHT SUPERIOR MEDIAL MIDBACK
LOCATION DETAILED: INFERIOR THORACIC SPINE
LOCATION DETAILED: RIGHT ANTERIOR DISTAL THIGH
LOCATION DETAILED: STERNUM
LOCATION DETAILED: RIGHT INFERIOR CENTRAL MALAR CHEEK
LOCATION DETAILED: PERIUMBILICAL SKIN
LOCATION DETAILED: LEFT MEDIAL SUPERIOR CHEST
LOCATION DETAILED: LEFT INFERIOR CENTRAL MALAR CHEEK
LOCATION DETAILED: RIGHT PROXIMAL DORSAL FOREARM
LOCATION DETAILED: RIGHT SUPERIOR HELIX
LOCATION DETAILED: RIGHT SUPERIOR LATERAL UPPER BACK

## 2023-04-25 ASSESSMENT — LOCATION ZONE DERM
LOCATION ZONE: LEG
LOCATION ZONE: FACE
LOCATION ZONE: ARM
LOCATION ZONE: EAR
LOCATION ZONE: TRUNK

## 2023-04-25 ASSESSMENT — LOCATION SIMPLE DESCRIPTION DERM
LOCATION SIMPLE: ABDOMEN
LOCATION SIMPLE: UPPER BACK
LOCATION SIMPLE: LEFT THIGH
LOCATION SIMPLE: RIGHT UPPER BACK
LOCATION SIMPLE: RIGHT LOWER BACK
LOCATION SIMPLE: RIGHT CHEEK
LOCATION SIMPLE: RIGHT FOREARM
LOCATION SIMPLE: LEFT CHEEK
LOCATION SIMPLE: RIGHT THIGH
LOCATION SIMPLE: RIGHT EAR
LOCATION SIMPLE: CHEST

## 2023-04-25 NOTE — PROCEDURE: COUNSELING
Detail Level: Generalized
Detail Level: Detailed
Quality 137: Melanoma: Continuity Of Care - Recall System: Patient information entered into a recall system that includes: target date for the next exam specified AND a process to follow up with patients regarding missed or unscheduled appointments
When Should The Patient Follow-Up For Their Next Full-Body Skin Exam?: 3 Months
When Should The Patient Follow-Up For Their Next Full-Body Skin Exam?: 6 Months

## 2023-04-25 NOTE — HPI: EVALUATION OF SKIN LESION(S)
What Type Of Note Output Would You Prefer (Optional)?: Standard Output
Hpi Title: Evaluation of Skin Lesions
How Severe Are Your Spot(S)?: mild
Have Your Spot(S) Been Treated In The Past?: has not been treated
Location: Left forearm
Year Removed: 2020

## 2023-10-25 ENCOUNTER — APPOINTMENT (RX ONLY)
Dept: URBAN - METROPOLITAN AREA CLINIC 151 | Facility: CLINIC | Age: 78
Setting detail: DERMATOLOGY
End: 2023-10-25

## 2023-10-25 DIAGNOSIS — Z85.820 PERSONAL HISTORY OF MALIGNANT MELANOMA OF SKIN: ICD-10-CM | Status: RESOLVED

## 2023-10-25 DIAGNOSIS — Z85.828 PERSONAL HISTORY OF OTHER MALIGNANT NEOPLASM OF SKIN: ICD-10-CM | Status: RESOLVED

## 2023-10-25 DIAGNOSIS — L28.1 PRURIGO NODULARIS: ICD-10-CM | Status: STABLE

## 2023-10-25 DIAGNOSIS — L72.8 OTHER FOLLICULAR CYSTS OF THE SKIN AND SUBCUTANEOUS TISSUE: ICD-10-CM | Status: STABLE

## 2023-10-25 DIAGNOSIS — D22 MELANOCYTIC NEVI: ICD-10-CM

## 2023-10-25 DIAGNOSIS — D18.0 HEMANGIOMA: ICD-10-CM

## 2023-10-25 DIAGNOSIS — L81.4 OTHER MELANIN HYPERPIGMENTATION: ICD-10-CM

## 2023-10-25 DIAGNOSIS — L82.1 OTHER SEBORRHEIC KERATOSIS: ICD-10-CM

## 2023-10-25 PROBLEM — D22.5 MELANOCYTIC NEVI OF TRUNK: Status: ACTIVE | Noted: 2023-10-25

## 2023-10-25 PROBLEM — D18.01 HEMANGIOMA OF SKIN AND SUBCUTANEOUS TISSUE: Status: ACTIVE | Noted: 2023-10-25

## 2023-10-25 PROCEDURE — ? FULL BODY SKIN EXAM

## 2023-10-25 PROCEDURE — 99213 OFFICE O/P EST LOW 20 MIN: CPT

## 2023-10-25 PROCEDURE — ? COUNSELING

## 2023-10-25 PROCEDURE — ? OTHER

## 2023-10-25 PROCEDURE — ? TREATMENT REGIMEN

## 2023-10-25 ASSESSMENT — LOCATION SIMPLE DESCRIPTION DERM
LOCATION SIMPLE: LEFT THIGH
LOCATION SIMPLE: RIGHT LOWER BACK
LOCATION SIMPLE: RIGHT CHEEK
LOCATION SIMPLE: UPPER BACK
LOCATION SIMPLE: RIGHT PRETIBIAL REGION
LOCATION SIMPLE: LEFT PRETIBIAL REGION
LOCATION SIMPLE: CHEST
LOCATION SIMPLE: RIGHT UPPER BACK
LOCATION SIMPLE: RIGHT EAR
LOCATION SIMPLE: RIGHT KNEE
LOCATION SIMPLE: LEFT UPPER ARM
LOCATION SIMPLE: LEFT CHEEK
LOCATION SIMPLE: RIGHT THIGH
LOCATION SIMPLE: LEFT ANKLE
LOCATION SIMPLE: ABDOMEN
LOCATION SIMPLE: RIGHT ANKLE
LOCATION SIMPLE: RIGHT FOREARM

## 2023-10-25 ASSESSMENT — LOCATION DETAILED DESCRIPTION DERM
LOCATION DETAILED: LEFT PROXIMAL POSTERIOR UPPER ARM
LOCATION DETAILED: PERIUMBILICAL SKIN
LOCATION DETAILED: LEFT ANTERIOR DISTAL THIGH
LOCATION DETAILED: STERNUM
LOCATION DETAILED: RIGHT INFERIOR CENTRAL MALAR CHEEK
LOCATION DETAILED: LEFT INFERIOR CENTRAL MALAR CHEEK
LOCATION DETAILED: RIGHT PROXIMAL DORSAL FOREARM
LOCATION DETAILED: LEFT DISTAL PRETIBIAL REGION
LOCATION DETAILED: RIGHT SUPERIOR MEDIAL MIDBACK
LOCATION DETAILED: RIGHT KNEE
LOCATION DETAILED: RIGHT ANTERIOR DISTAL THIGH
LOCATION DETAILED: RIGHT SUPERIOR LATERAL UPPER BACK
LOCATION DETAILED: INFERIOR THORACIC SPINE
LOCATION DETAILED: LEFT POSTERIOR ANKLE
LOCATION DETAILED: RIGHT POSTERIOR ANKLE
LOCATION DETAILED: RIGHT DISTAL PRETIBIAL REGION
LOCATION DETAILED: SUPERIOR THORACIC SPINE
LOCATION DETAILED: LEFT MEDIAL SUPERIOR CHEST
LOCATION DETAILED: RIGHT SUPERIOR HELIX

## 2023-10-25 ASSESSMENT — LOCATION ZONE DERM
LOCATION ZONE: LEG
LOCATION ZONE: FACE
LOCATION ZONE: EAR
LOCATION ZONE: ARM
LOCATION ZONE: TRUNK

## 2023-10-25 NOTE — PROCEDURE: COUNSELING
Detail Level: Generalized
Detail Level: Detailed
Quality 137: Melanoma: Continuity Of Care - Recall System: Patient information entered into a recall system that includes: target date for the next exam specified AND a process to follow up with patients regarding missed or unscheduled appointments
When Should The Patient Follow-Up For Their Next Full-Body Skin Exam?: 3 Months
When Should The Patient Follow-Up For Their Next Full-Body Skin Exam?: 6 Months
Detail Level: Zone

## 2024-04-25 ENCOUNTER — APPOINTMENT (RX ONLY)
Dept: URBAN - METROPOLITAN AREA CLINIC 151 | Facility: CLINIC | Age: 79
Setting detail: DERMATOLOGY
End: 2024-04-25

## 2024-04-25 DIAGNOSIS — D18.0 HEMANGIOMA: ICD-10-CM

## 2024-04-25 DIAGNOSIS — L28.1 PRURIGO NODULARIS: ICD-10-CM | Status: STABLE

## 2024-04-25 DIAGNOSIS — L57.0 ACTINIC KERATOSIS: ICD-10-CM | Status: INADEQUATELY CONTROLLED

## 2024-04-25 DIAGNOSIS — L81.4 OTHER MELANIN HYPERPIGMENTATION: ICD-10-CM

## 2024-04-25 DIAGNOSIS — L72.8 OTHER FOLLICULAR CYSTS OF THE SKIN AND SUBCUTANEOUS TISSUE: ICD-10-CM | Status: STABLE

## 2024-04-25 DIAGNOSIS — Z85.820 PERSONAL HISTORY OF MALIGNANT MELANOMA OF SKIN: ICD-10-CM

## 2024-04-25 DIAGNOSIS — L82.1 OTHER SEBORRHEIC KERATOSIS: ICD-10-CM

## 2024-04-25 DIAGNOSIS — D22 MELANOCYTIC NEVI: ICD-10-CM

## 2024-04-25 DIAGNOSIS — Z85.828 PERSONAL HISTORY OF OTHER MALIGNANT NEOPLASM OF SKIN: ICD-10-CM

## 2024-04-25 PROBLEM — D18.01 HEMANGIOMA OF SKIN AND SUBCUTANEOUS TISSUE: Status: ACTIVE | Noted: 2024-04-25

## 2024-04-25 PROBLEM — D48.5 NEOPLASM OF UNCERTAIN BEHAVIOR OF SKIN: Status: ACTIVE | Noted: 2024-04-25

## 2024-04-25 PROBLEM — D22.5 MELANOCYTIC NEVI OF TRUNK: Status: ACTIVE | Noted: 2024-04-25

## 2024-04-25 PROCEDURE — ? LIQUID NITROGEN

## 2024-04-25 PROCEDURE — 17000 DESTRUCT PREMALG LESION: CPT | Mod: 59

## 2024-04-25 PROCEDURE — 11102 TANGNTL BX SKIN SINGLE LES: CPT

## 2024-04-25 PROCEDURE — ? FULL BODY SKIN EXAM

## 2024-04-25 PROCEDURE — ? BIOPSY BY SHAVE METHOD

## 2024-04-25 PROCEDURE — ? OTHER

## 2024-04-25 PROCEDURE — ? TREATMENT REGIMEN

## 2024-04-25 PROCEDURE — ? COUNSELING

## 2024-04-25 PROCEDURE — 99213 OFFICE O/P EST LOW 20 MIN: CPT | Mod: 25

## 2024-04-25 ASSESSMENT — LOCATION DETAILED DESCRIPTION DERM
LOCATION DETAILED: RIGHT SUPERIOR MEDIAL MIDBACK
LOCATION DETAILED: LEFT INFERIOR UPPER BACK
LOCATION DETAILED: RIGHT PROXIMAL DORSAL FOREARM
LOCATION DETAILED: STERNUM
LOCATION DETAILED: RIGHT SUPERIOR HELIX
LOCATION DETAILED: RIGHT SUPERIOR UPPER BACK
LOCATION DETAILED: LEFT INFERIOR CENTRAL MALAR CHEEK
LOCATION DETAILED: RIGHT ANTERIOR DISTAL THIGH
LOCATION DETAILED: LEFT PROXIMAL PRETIBIAL REGION
LOCATION DETAILED: LEFT SUPERIOR MEDIAL UPPER BACK
LOCATION DETAILED: RIGHT INFERIOR CENTRAL MALAR CHEEK
LOCATION DETAILED: INFERIOR THORACIC SPINE
LOCATION DETAILED: RIGHT ANTERIOR PROXIMAL THIGH
LOCATION DETAILED: LEFT MEDIAL SUPERIOR CHEST
LOCATION DETAILED: RIGHT PROXIMAL PRETIBIAL REGION
LOCATION DETAILED: PERIUMBILICAL SKIN
LOCATION DETAILED: LEFT ANTERIOR DISTAL THIGH
LOCATION DETAILED: SUPERIOR THORACIC SPINE

## 2024-04-25 ASSESSMENT — LOCATION SIMPLE DESCRIPTION DERM
LOCATION SIMPLE: LEFT UPPER BACK
LOCATION SIMPLE: LEFT CHEEK
LOCATION SIMPLE: LEFT PRETIBIAL REGION
LOCATION SIMPLE: RIGHT LOWER BACK
LOCATION SIMPLE: RIGHT UPPER BACK
LOCATION SIMPLE: RIGHT THIGH
LOCATION SIMPLE: RIGHT FOREARM
LOCATION SIMPLE: ABDOMEN
LOCATION SIMPLE: RIGHT PRETIBIAL REGION
LOCATION SIMPLE: UPPER BACK
LOCATION SIMPLE: LEFT THIGH
LOCATION SIMPLE: CHEST
LOCATION SIMPLE: RIGHT CHEEK
LOCATION SIMPLE: RIGHT EAR

## 2024-04-25 ASSESSMENT — LOCATION ZONE DERM
LOCATION ZONE: FACE
LOCATION ZONE: EAR
LOCATION ZONE: LEG
LOCATION ZONE: ARM
LOCATION ZONE: TRUNK

## 2024-04-25 NOTE — PROCEDURE: OTHER
Other (Free Text): 2020
Detail Level: Zone
Note Text (......Xxx Chief Complaint.): This diagnosis correlates with the
Render Risk Assessment In Note?: yes

## 2024-04-25 NOTE — PROCEDURE: LIQUID NITROGEN
Number Of Freeze-Thaw Cycles: 3 freeze-thaw cycles
Duration Of Freeze Thaw-Cycle (Seconds): 2
Show Aperture Variable?: Yes
Consent: The patient's consent was obtained including but not limited to risks of crusting, scabbing, blistering, scarring, darker or lighter pigmentary change, recurrence, incomplete removal and infection.
Post-Care Instructions: I reviewed with the patient in detail post-care instructions. Patient is to wear sunprotection, and avoid picking at any of the treated lesions. Pt may apply Vaseline to crusted or scabbing areas.
Detail Level: Detailed
Render Post-Care Instructions In Note?: no

## 2024-10-29 ENCOUNTER — APPOINTMENT (RX ONLY)
Dept: URBAN - METROPOLITAN AREA CLINIC 331 | Facility: CLINIC | Age: 79
Setting detail: DERMATOLOGY
End: 2024-10-29

## 2024-10-29 DIAGNOSIS — L72.8 OTHER FOLLICULAR CYSTS OF THE SKIN AND SUBCUTANEOUS TISSUE: ICD-10-CM | Status: STABLE

## 2024-10-29 DIAGNOSIS — Z85.820 PERSONAL HISTORY OF MALIGNANT MELANOMA OF SKIN: ICD-10-CM

## 2024-10-29 DIAGNOSIS — D22 MELANOCYTIC NEVI: ICD-10-CM

## 2024-10-29 DIAGNOSIS — L81.4 OTHER MELANIN HYPERPIGMENTATION: ICD-10-CM

## 2024-10-29 DIAGNOSIS — L82.1 OTHER SEBORRHEIC KERATOSIS: ICD-10-CM

## 2024-10-29 DIAGNOSIS — D18.0 HEMANGIOMA: ICD-10-CM

## 2024-10-29 DIAGNOSIS — Z85.828 PERSONAL HISTORY OF OTHER MALIGNANT NEOPLASM OF SKIN: ICD-10-CM

## 2024-10-29 PROBLEM — D22.5 MELANOCYTIC NEVI OF TRUNK: Status: ACTIVE | Noted: 2024-10-29

## 2024-10-29 PROBLEM — D18.01 HEMANGIOMA OF SKIN AND SUBCUTANEOUS TISSUE: Status: ACTIVE | Noted: 2024-10-29

## 2024-10-29 PROCEDURE — ? TREATMENT REGIMEN

## 2024-10-29 PROCEDURE — ? OTHER

## 2024-10-29 PROCEDURE — 99213 OFFICE O/P EST LOW 20 MIN: CPT

## 2024-10-29 PROCEDURE — ? FULL BODY SKIN EXAM

## 2024-10-29 PROCEDURE — ? COUNSELING

## 2024-10-29 ASSESSMENT — LOCATION DETAILED DESCRIPTION DERM
LOCATION DETAILED: RIGHT PROXIMAL DORSAL FOREARM
LOCATION DETAILED: RIGHT SUPERIOR HELIX
LOCATION DETAILED: PERIUMBILICAL SKIN
LOCATION DETAILED: RIGHT ANTERIOR DISTAL THIGH
LOCATION DETAILED: LEFT MEDIAL SUPERIOR CHEST
LOCATION DETAILED: LEFT ANTERIOR DISTAL THIGH
LOCATION DETAILED: LEFT INFERIOR CENTRAL MALAR CHEEK
LOCATION DETAILED: RIGHT SUPERIOR MEDIAL MIDBACK
LOCATION DETAILED: INFERIOR THORACIC SPINE
LOCATION DETAILED: STERNUM
LOCATION DETAILED: RIGHT POSTERIOR SHOULDER
LOCATION DETAILED: RIGHT INFERIOR CENTRAL MALAR CHEEK
LOCATION DETAILED: SUPERIOR THORACIC SPINE

## 2024-10-29 ASSESSMENT — LOCATION SIMPLE DESCRIPTION DERM
LOCATION SIMPLE: RIGHT THIGH
LOCATION SIMPLE: RIGHT FOREARM
LOCATION SIMPLE: UPPER BACK
LOCATION SIMPLE: RIGHT EAR
LOCATION SIMPLE: RIGHT CHEEK
LOCATION SIMPLE: ABDOMEN
LOCATION SIMPLE: CHEST
LOCATION SIMPLE: LEFT CHEEK
LOCATION SIMPLE: RIGHT SHOULDER
LOCATION SIMPLE: LEFT THIGH
LOCATION SIMPLE: RIGHT LOWER BACK

## 2024-10-29 ASSESSMENT — LOCATION ZONE DERM
LOCATION ZONE: EAR
LOCATION ZONE: ARM
LOCATION ZONE: FACE
LOCATION ZONE: LEG
LOCATION ZONE: TRUNK

## 2025-05-01 ENCOUNTER — APPOINTMENT (OUTPATIENT)
Dept: URBAN - METROPOLITAN AREA CLINIC 331 | Facility: CLINIC | Age: 80
Setting detail: DERMATOLOGY
End: 2025-05-01

## 2025-05-01 DIAGNOSIS — L81.4 OTHER MELANIN HYPERPIGMENTATION: ICD-10-CM

## 2025-05-01 DIAGNOSIS — L82.1 OTHER SEBORRHEIC KERATOSIS: ICD-10-CM

## 2025-05-01 DIAGNOSIS — D18.0 HEMANGIOMA: ICD-10-CM

## 2025-05-01 DIAGNOSIS — L57.0 ACTINIC KERATOSIS: ICD-10-CM | Status: INADEQUATELY CONTROLLED

## 2025-05-01 DIAGNOSIS — D22 MELANOCYTIC NEVI: ICD-10-CM

## 2025-05-01 DIAGNOSIS — L72.8 OTHER FOLLICULAR CYSTS OF THE SKIN AND SUBCUTANEOUS TISSUE: ICD-10-CM | Status: STABLE

## 2025-05-01 PROBLEM — D18.01 HEMANGIOMA OF SKIN AND SUBCUTANEOUS TISSUE: Status: ACTIVE | Noted: 2025-05-01

## 2025-05-01 PROBLEM — D22.5 MELANOCYTIC NEVI OF TRUNK: Status: ACTIVE | Noted: 2025-05-01

## 2025-05-01 PROCEDURE — 17000 DESTRUCT PREMALG LESION: CPT

## 2025-05-01 PROCEDURE — ? FULL BODY SKIN EXAM

## 2025-05-01 PROCEDURE — 99213 OFFICE O/P EST LOW 20 MIN: CPT | Mod: 25

## 2025-05-01 PROCEDURE — ? COUNSELING

## 2025-05-01 PROCEDURE — ? TREATMENT REGIMEN

## 2025-05-01 PROCEDURE — ? LIQUID NITROGEN

## 2025-05-01 PROCEDURE — 17003 DESTRUCT PREMALG LES 2-14: CPT

## 2025-05-01 ASSESSMENT — LOCATION ZONE DERM
LOCATION ZONE: TRUNK
LOCATION ZONE: LEG
LOCATION ZONE: FACE

## 2025-05-01 ASSESSMENT — LOCATION DETAILED DESCRIPTION DERM
LOCATION DETAILED: LEFT DISTAL CALF
LOCATION DETAILED: LEFT INFERIOR CENTRAL MALAR CHEEK
LOCATION DETAILED: RIGHT DISTAL PRETIBIAL REGION
LOCATION DETAILED: RIGHT DISTAL CALF
LOCATION DETAILED: INFERIOR THORACIC SPINE
LOCATION DETAILED: RIGHT INFERIOR CENTRAL MALAR CHEEK
LOCATION DETAILED: RIGHT ANTERIOR DISTAL THIGH
LOCATION DETAILED: LEFT ANTERIOR DISTAL THIGH
LOCATION DETAILED: STERNUM
LOCATION DETAILED: RIGHT SUPERIOR MEDIAL MIDBACK
LOCATION DETAILED: LEFT DISTAL PRETIBIAL REGION
LOCATION DETAILED: PERIUMBILICAL SKIN
LOCATION DETAILED: RIGHT SUPERIOR UPPER BACK
LOCATION DETAILED: LEFT MEDIAL SUPERIOR CHEST
LOCATION DETAILED: SUPERIOR THORACIC SPINE

## 2025-05-01 ASSESSMENT — LOCATION SIMPLE DESCRIPTION DERM
LOCATION SIMPLE: RIGHT PRETIBIAL REGION
LOCATION SIMPLE: RIGHT LOWER BACK
LOCATION SIMPLE: RIGHT UPPER BACK
LOCATION SIMPLE: RIGHT CALF
LOCATION SIMPLE: CHEST
LOCATION SIMPLE: UPPER BACK
LOCATION SIMPLE: LEFT CHEEK
LOCATION SIMPLE: RIGHT CHEEK
LOCATION SIMPLE: LEFT THIGH
LOCATION SIMPLE: ABDOMEN
LOCATION SIMPLE: LEFT PRETIBIAL REGION
LOCATION SIMPLE: LEFT CALF
LOCATION SIMPLE: RIGHT THIGH

## 2025-05-01 NOTE — PROCEDURE: LIQUID NITROGEN
Show Aperture Variable?: Yes
Post-Care Instructions: I reviewed with the patient in detail post-care instructions. Patient is to wear sunprotection, and avoid picking at any of the treated lesions. Pt may apply Vaseline to crusted or scabbing areas.
Duration Of Freeze Thaw-Cycle (Seconds): 2
Render Note In Bullet Format When Appropriate: No
Number Of Freeze-Thaw Cycles: 3 freeze-thaw cycles
Detail Level: Detailed
Consent: The patient's consent was obtained including but not limited to risks of crusting, scabbing, blistering, scarring, darker or lighter pigmentary change, recurrence, incomplete removal and infection.